# Patient Record
Sex: FEMALE | Race: WHITE | NOT HISPANIC OR LATINO | Employment: UNEMPLOYED | URBAN - METROPOLITAN AREA
[De-identification: names, ages, dates, MRNs, and addresses within clinical notes are randomized per-mention and may not be internally consistent; named-entity substitution may affect disease eponyms.]

---

## 2017-12-19 ENCOUNTER — HOSPITAL ENCOUNTER (EMERGENCY)
Facility: HOSPITAL | Age: 27
Discharge: HOME | End: 2017-12-20
Attending: EMERGENCY MEDICINE
Payer: MEDICAID

## 2017-12-19 DIAGNOSIS — R10.31 RIGHT LOWER QUADRANT ABDOMINAL PAIN: Primary | ICD-10-CM

## 2017-12-19 DIAGNOSIS — I88.0 MESENTERIC ADENITIS: ICD-10-CM

## 2017-12-19 LAB
B-HCG UR QL: NEGATIVE
BASOPHILS # BLD AUTO: 0.02 K/UL
BASOPHILS NFR BLD: 0.2 %
BILIRUB UR QL STRIP: NEGATIVE
CLARITY UR REFRACT.AUTO: CLEAR
COLOR UR AUTO: YELLOW
CTP QC/QA: YES
DIFFERENTIAL METHOD: NORMAL
EOSINOPHIL # BLD AUTO: 0.1 K/UL
EOSINOPHIL NFR BLD: 1.3 %
ERYTHROCYTE [DISTWIDTH] IN BLOOD BY AUTOMATED COUNT: 12.8 %
GLUCOSE UR QL STRIP: NEGATIVE
HCT VFR BLD AUTO: 38.7 %
HGB BLD-MCNC: 13.4 G/DL
HGB UR QL STRIP: ABNORMAL
IMM GRANULOCYTES # BLD AUTO: 0.03 K/UL
IMM GRANULOCYTES NFR BLD AUTO: 0.3 %
KETONES UR QL STRIP: NEGATIVE
LEUKOCYTE ESTERASE UR QL STRIP: ABNORMAL
LIPASE SERPL-CCNC: 27 U/L
LYMPHOCYTES # BLD AUTO: 2.3 K/UL
LYMPHOCYTES NFR BLD: 23.3 %
MCH RBC QN AUTO: 29.9 PG
MCHC RBC AUTO-ENTMCNC: 34.6 G/DL
MCV RBC AUTO: 86 FL
MICROSCOPIC COMMENT: ABNORMAL
MONOCYTES # BLD AUTO: 0.7 K/UL
MONOCYTES NFR BLD: 7.2 %
NEUTROPHILS # BLD AUTO: 6.7 K/UL
NEUTROPHILS NFR BLD: 67.7 %
NITRITE UR QL STRIP: NEGATIVE
NRBC BLD-RTO: 0 /100 WBC
PH UR STRIP: 5 [PH] (ref 5–8)
PLATELET # BLD AUTO: 336 K/UL
PMV BLD AUTO: 9.8 FL
PROT UR QL STRIP: NEGATIVE
RBC # BLD AUTO: 4.48 M/UL
RBC #/AREA URNS AUTO: 58 /HPF (ref 0–4)
SP GR UR STRIP: 1.02 (ref 1–1.03)
SQUAMOUS #/AREA URNS AUTO: 2 /HPF
URN SPEC COLLECT METH UR: ABNORMAL
UROBILINOGEN UR STRIP-ACNC: NEGATIVE EU/DL
WBC # BLD AUTO: 9.89 K/UL
WBC #/AREA URNS AUTO: 5 /HPF (ref 0–5)

## 2017-12-19 PROCEDURE — 99284 EMERGENCY DEPT VISIT MOD MDM: CPT | Mod: 25

## 2017-12-19 PROCEDURE — 85025 COMPLETE CBC W/AUTO DIFF WBC: CPT

## 2017-12-19 PROCEDURE — 96361 HYDRATE IV INFUSION ADD-ON: CPT

## 2017-12-19 PROCEDURE — 96374 THER/PROPH/DIAG INJ IV PUSH: CPT

## 2017-12-19 PROCEDURE — 99284 EMERGENCY DEPT VISIT MOD MDM: CPT | Mod: ,,, | Performed by: EMERGENCY MEDICINE

## 2017-12-19 PROCEDURE — 96375 TX/PRO/DX INJ NEW DRUG ADDON: CPT

## 2017-12-19 PROCEDURE — 81001 URINALYSIS AUTO W/SCOPE: CPT

## 2017-12-19 PROCEDURE — 25000003 PHARM REV CODE 250: Performed by: EMERGENCY MEDICINE

## 2017-12-19 PROCEDURE — 81025 URINE PREGNANCY TEST: CPT | Performed by: EMERGENCY MEDICINE

## 2017-12-19 PROCEDURE — 83690 ASSAY OF LIPASE: CPT

## 2017-12-19 PROCEDURE — 63600175 PHARM REV CODE 636 W HCPCS: Performed by: EMERGENCY MEDICINE

## 2017-12-19 RX ORDER — ONDANSETRON 2 MG/ML
4 INJECTION INTRAMUSCULAR; INTRAVENOUS
Status: COMPLETED | OUTPATIENT
Start: 2017-12-19 | End: 2017-12-19

## 2017-12-19 RX ORDER — KETOROLAC TROMETHAMINE 30 MG/ML
10 INJECTION, SOLUTION INTRAMUSCULAR; INTRAVENOUS
Status: COMPLETED | OUTPATIENT
Start: 2017-12-19 | End: 2017-12-19

## 2017-12-19 RX ADMIN — SODIUM CHLORIDE 1000 ML: 0.9 INJECTION, SOLUTION INTRAVENOUS at 11:12

## 2017-12-19 RX ADMIN — KETOROLAC TROMETHAMINE 10 MG: 30 INJECTION, SOLUTION INTRAMUSCULAR at 11:12

## 2017-12-19 RX ADMIN — ONDANSETRON 4 MG: 2 INJECTION INTRAMUSCULAR; INTRAVENOUS at 11:12

## 2017-12-20 VITALS
WEIGHT: 140 LBS | DIASTOLIC BLOOD PRESSURE: 60 MMHG | SYSTOLIC BLOOD PRESSURE: 112 MMHG | OXYGEN SATURATION: 99 % | RESPIRATION RATE: 16 BRPM | TEMPERATURE: 98 F | HEIGHT: 59 IN | BODY MASS INDEX: 28.22 KG/M2 | HEART RATE: 78 BPM

## 2017-12-20 LAB
BUN SERPL-MCNC: 11 MG/DL (ref 6–30)
CHLORIDE SERPL-SCNC: 103 MMOL/L (ref 95–110)
CREAT SERPL-MCNC: 0.7 MG/DL (ref 0.5–1.4)
GLUCOSE SERPL-MCNC: 93 MG/DL (ref 70–110)
HCT VFR BLD CALC: 35 %PCV (ref 36–54)
POC IONIZED CALCIUM: 1.1 MMOL/L (ref 1.06–1.42)
POC TCO2 (MEASURED): 23 MMOL/L (ref 23–29)
POTASSIUM BLD-SCNC: 3.5 MMOL/L (ref 3.5–5.1)
SAMPLE: ABNORMAL
SODIUM BLD-SCNC: 139 MMOL/L (ref 136–145)

## 2017-12-20 PROCEDURE — 25500020 PHARM REV CODE 255: Performed by: EMERGENCY MEDICINE

## 2017-12-20 RX ORDER — MELOXICAM 15 MG/1
15 TABLET ORAL DAILY PRN
Qty: 14 TABLET | Refills: 0 | Status: SHIPPED | OUTPATIENT
Start: 2017-12-20 | End: 2019-03-10

## 2017-12-20 RX ORDER — ONDANSETRON 4 MG/1
4 TABLET, ORALLY DISINTEGRATING ORAL EVERY 6 HOURS PRN
Qty: 16 TABLET | Refills: 0 | Status: ON HOLD | OUTPATIENT
Start: 2017-12-20 | End: 2018-10-18 | Stop reason: SDUPTHER

## 2017-12-20 RX ADMIN — IOHEXOL 75 ML: 350 INJECTION, SOLUTION INTRAVENOUS at 12:12

## 2017-12-20 NOTE — ED TRIAGE NOTES
Pt c/o RLQ pain for the pat few days. Pt states pain has been waking her up and causing nausea and vomiting. Pt describes pain as sharp intermittent pain that comes and goes every few hours. Pt states she has IUD in place and is concerned it may have shifted. Pt currently having period, pt states usually after period starts cramping goes away but has continued this time.

## 2017-12-20 NOTE — ED PROVIDER NOTES
"Encounter Date: 12/19/2017    SCRIBE #1 NOTE: I, Marlon Mullins, am scribing for, and in the presence of,  Dr. Conte. I have scribed the following portions of the note -       History     Chief Complaint   Patient presents with    Abdominal Pain     onset this am, denies dysuria.     Nausea    Vomiting     Time patient was seen by the provider: 10:18 PM      The patient is a 27 y.o. female that presents to the ED with a complaint of chronic R sided abdominal pain for several weeks duration. Pt reports "sharp" abdominal pain with associated nausea that is currently resolved. Of note, pt with 10 year IUD in place following 2 pregnancies in the past 6 years. She is not seen by a gynecologist and has not been evaluated in approximately 3-4 years. She denies painful intercourse or vaginal discharge. Pt has taken 1 phenergan with no relief of nausea. She denies the following sx: diarrhea, sore throat, difficulty swallowing, cough, SOB, changes in BM, melena, recent weight loss/gain or flank pain. No hx of kidney stones or ovarian cysts.          Review of patient's allergies indicates:  No Known Allergies  History reviewed. No pertinent past medical history.  Past Surgical History:   Procedure Laterality Date    FRACTURE SURGERY       History reviewed. No pertinent family history.  Social History   Substance Use Topics    Smoking status: Former Smoker    Smokeless tobacco: Never Used    Alcohol use No     Review of Systems   Constitutional: Negative for chills, fever and unexpected weight change.   HENT: Negative for sore throat and trouble swallowing.    Eyes: Negative for visual disturbance.   Respiratory: Negative for shortness of breath.    Cardiovascular: Negative for chest pain.   Gastrointestinal: Positive for abdominal pain (RLQ). Negative for blood in stool, diarrhea, nausea and vomiting.   Genitourinary: Negative for dysuria, vaginal discharge and vaginal pain.   Musculoskeletal: Negative for back pain. "   Skin: Negative for rash.   Neurological: Negative for headaches.       Physical Exam     Initial Vitals [12/19/17 1840]   BP Pulse Resp Temp SpO2   122/72 85 16 99.5 °F (37.5 °C) 100 %      MAP       88.67         Physical Exam    Nursing note and vitals reviewed.  Constitutional: She appears well-developed and well-nourished. She is not diaphoretic. No distress.   26 yo  female in no acute distress noted.    HENT:   Head: Normocephalic and atraumatic.   Mouth/Throat: Oropharynx is clear and moist.   Eyes: EOM are normal. Pupils are equal, round, and reactive to light.   Neck: No tracheal deviation present.   Cardiovascular: Normal rate, regular rhythm and intact distal pulses.   No murmur heard.  Pulmonary/Chest: Breath sounds normal. No stridor. No respiratory distress.   Abdominal: Soft. She exhibits no distension. There is tenderness (RLQ ttp). There is no rebound.   Musculoskeletal: Normal range of motion. She exhibits no edema (peripheral).   Moves all extremities x4   Neurological: She is alert and oriented to person, place, and time. No sensory deficit.   Skin: Skin is warm and dry.   Psychiatric: Her behavior is normal. Thought content normal.         ED Course   Procedures  Labs Reviewed   URINALYSIS - Abnormal; Notable for the following:        Result Value    Occult Blood UA 3+ (*)     Leukocytes, UA Trace (*)     All other components within normal limits   URINALYSIS MICROSCOPIC - Abnormal; Notable for the following:     RBC, UA 58 (*)     All other components within normal limits   ISTAT PROCEDURE - Abnormal; Notable for the following:     POC Hematocrit 35 (*)     All other components within normal limits   CBC W/ AUTO DIFFERENTIAL   LIPASE   POCT URINE PREGNANCY   ISTAT CHEM8        Imaging Results          CT Abdomen Pelvis With Contrast (Final result)  Result time 12/20/17 01:24:43    Final result by Ken Larose MD (12/20/17 01:24:43)                 Impression:      No acute  intra-abdominal abnormality to definitively explain the patient's right lower quadrant tenderness.    Nonvisualization of the appendix, however no secondary signs of acute appendicitis.    A few prominent lymph node nodes in the right lower quadrant mesentery.  Correlate clinically for mesenteric adenitis.    ______________________________________     Electronically signed by resident: SHANNAN TOBIAS MD  Date:     12/20/17  Time:    00:55            As the supervising and teaching physician, I personally reviewed the images and resident's interpretation and I agree with the findings.          Electronically signed by: MILTON WHITNEY MD  Date:     12/20/17  Time:    01:24              Narrative:    CT ABDOMEN AND PELVIS    INDICATION:   Right lower quadrant tenderness to palpation.    TECHNIQUE:  Axial 5 mm images were obtained of the abdomen and pelvis after the administration of 75 cc of Omnipaque 350 intravenous contrast. Sagittal and coronal reformats were obtained. Oral contrast was not administered.    COMPARISON:  No available priors.    FINDINGS:  Visualized heart is unremarkable. No pericardial effusion. Lung bases are clear. No pleural effusion.    The liver is normal in size. No focal hepatic lesions.    Gallbladder appears slightly contracted. No stones. No biliary ductal dilatation.    The spleen, stomach, pancreas, and adrenal glands are unremarkable.    Small bowel is normal in caliber. No evidence of obstruction. Large bowel appears unremarkable. The appendix is not definitively identified however there are no secondary signs of acute appendicitis.    The kidneys are normal in size. No hydronephrosis. Visualized portions of the ureters appear unremarkable. The urinary bladder is unremarkable.    The uterus is visualized. There is an IUD in place. The adnexa appear unremarkable.    Scattered, normal appearing mesenteric, para-aortic, and inguinal lymph nodes are noted.  A few prominent lymph node nodes  in the right lower quadrant mesentery. No definite lymphadenopathy.    The aorta maintains normal dimensions. The celiac, SMA, and MAYRA are patent.    There is a small fat-containing umbilical hernia. Soft tissues are otherwise unremarkable.    Left hip ORIF hardware appears intact. Osseous structures otherwise unremarkable.                                 Medical Decision Making:   History:   Old Medical Records: I decided to obtain old medical records.  Initial Assessment:   My initial differential diagnoses include but are not limited to: acute appendicitis, irritable bowel syndrome, ovarian cyst, renal colic.  Clinical Tests:   Lab Tests: Ordered and Reviewed  Radiological Study: Ordered and Reviewed            Scribe Attestation:   Scribe #1: I performed the above scribed service and the documentation accurately describes the services I performed. I attest to the accuracy of the note.    Attending Attestation:             Attending ED Notes:   Emergency department evaluation today reveals evidence of mild to moderate lymphadenopathy in the right lower quadrant on CT scan of abdomen and pelvis with IV contrast without evidence of associated appendicitis or acute inflammatory change other than lymphadenopathy.  Additionally, laboratory findings are within normal limits, and urinalysis findings consistent with current menstrual cycle.  The patient describes resolution of her presenting complaints with ED therapy.  She will be discharged to home in improved condition with prescription for meloxicam 50 mg be taken daily as needed for pain for the next 2 weeks, as well as Zofran to be taken as needed for recurrent nausea.  I have discussed the importance of outpatient follow-up with gynecology to monitor and manage her chronic needs; and have discussed indications to return to this emergency department for failure to improve or worsening of current condition.          ED Course      Clinical Impression:   The primary  encounter diagnosis was Right lower quadrant abdominal pain. A diagnosis of Mesenteric adenitis was also pertinent to this visit.    Disposition:   Disposition: Discharged  Condition: Stable                        Viet Conte MD  12/20/17 0155

## 2017-12-20 NOTE — ED NOTES
Patient identifiers for Masha Lucas checked and correct.  LOC: Patient is awake, alert, and aware of environment with an appropriate affect. Patient is oriented x 3 and speaking appropriately.  APPEARANCE: Patient resting comfortably and in no acute distress. Patient is clean and well groomed, patient's clothing is properly fastened.  SKIN: The skin is warm and dry. Patient has normal skin turgor and moist mucus membrances. Skin is intact; no bruising or breakdown noted.  MUSKULOSKELETAL: Patient is moving all extremities well, no obvious deformities noted. Pulses intact.   RESPIRATORY: Airway is open and patent. Respirations are spontaneous and non-labored with normal effort and rate.  CARDIAC: Patient has a normal rate and rhythm. No peripheral edema noted. Capillary refill < 3 seconds.  ABDOMEN: No distention noted. Bowel sounds active in all 4 quadrants. Soft and non-tender upon palpation.  NEUROLOGICAL: PERRL. Facial expression is symmetrical. Hand grasps are equal bilaterally. Normal sensation in all extremities when touched with finger.  Allergies reported: Review of patient's allergies indicates:  No Known Allergies

## 2018-10-16 PROBLEM — D72.829 LEUCOCYTOSIS: Status: ACTIVE | Noted: 2018-10-16

## 2018-10-16 PROBLEM — K52.9 ACUTE GASTROENTERITIS: Status: ACTIVE | Noted: 2018-10-16

## 2018-10-16 PROBLEM — K52.9 ENTEROCOLITIS: Status: ACTIVE | Noted: 2018-10-16

## 2018-10-17 PROBLEM — E87.6 HYPOKALEMIA: Status: ACTIVE | Noted: 2018-10-17

## 2019-03-10 PROBLEM — K85.20 ALCOHOL-INDUCED ACUTE PANCREATITIS WITHOUT INFECTION OR NECROSIS: Status: ACTIVE | Noted: 2019-03-10

## 2019-03-10 PROBLEM — E86.0 DEHYDRATION: Status: ACTIVE | Noted: 2019-03-10

## 2019-03-10 PROBLEM — K85.20 ALCOHOL-INDUCED ACUTE PANCREATITIS: Status: ACTIVE | Noted: 2019-03-10

## 2019-03-11 PROBLEM — R10.13 EPIGASTRIC PAIN: Status: ACTIVE | Noted: 2019-03-11

## 2019-03-11 PROBLEM — R11.2 NAUSEA AND VOMITING: Status: ACTIVE | Noted: 2019-03-11

## 2019-03-11 PROBLEM — K85.20 ALCOHOL-INDUCED ACUTE PANCREATITIS: Status: ACTIVE | Noted: 2019-03-11

## 2019-03-12 PROBLEM — R03.0 ELEVATED BP WITHOUT DIAGNOSIS OF HYPERTENSION: Status: ACTIVE | Noted: 2019-03-12

## 2019-03-12 PROBLEM — E83.39 HYPOPHOSPHATEMIA: Status: ACTIVE | Noted: 2019-03-12

## 2019-04-18 PROBLEM — F11.93 HEROIN WITHDRAWAL: Status: ACTIVE | Noted: 2019-04-18
